# Patient Record
Sex: FEMALE | Race: WHITE | NOT HISPANIC OR LATINO | Employment: FULL TIME | URBAN - METROPOLITAN AREA
[De-identification: names, ages, dates, MRNs, and addresses within clinical notes are randomized per-mention and may not be internally consistent; named-entity substitution may affect disease eponyms.]

---

## 2021-09-17 ENCOUNTER — LAB REQUISITION (OUTPATIENT)
Dept: LAB | Facility: CLINIC | Age: 56
End: 2021-09-17

## 2021-09-17 PROCEDURE — 86706 HEP B SURFACE ANTIBODY: CPT | Performed by: INTERNAL MEDICINE

## 2021-09-17 PROCEDURE — 86762 RUBELLA ANTIBODY: CPT | Performed by: INTERNAL MEDICINE

## 2021-09-17 PROCEDURE — 86765 RUBEOLA ANTIBODY: CPT | Performed by: INTERNAL MEDICINE

## 2021-09-17 PROCEDURE — 86481 TB AG RESPONSE T-CELL SUSP: CPT | Performed by: INTERNAL MEDICINE

## 2021-09-17 PROCEDURE — 86787 VARICELLA-ZOSTER ANTIBODY: CPT | Performed by: INTERNAL MEDICINE

## 2021-09-17 PROCEDURE — 86735 MUMPS ANTIBODY: CPT | Performed by: INTERNAL MEDICINE

## 2021-09-18 LAB — HBV SURFACE AB SERPL IA-ACNC: 9.86 M[IU]/ML

## 2021-09-19 LAB
GAMMA INTERFERON BACKGROUND BLD IA-ACNC: 0.03 IU/ML
M TB IFN-G BLD-IMP: NEGATIVE
M TB IFN-G CD4+ BCKGRND COR BLD-ACNC: 9.97 IU/ML
MITOGEN IGNF BCKGRD COR BLD-ACNC: 0 IU/ML
MITOGEN IGNF BCKGRD COR BLD-ACNC: 0.02 IU/ML
QUANTIFERON MITOGEN: 10 IU/ML
QUANTIFERON NIL TUBE: 0.03 IU/ML
QUANTIFERON TB1 TUBE: 0.03 IU/ML
QUANTIFERON TB2 TUBE: 0.05

## 2021-09-20 LAB
MEV IGG SER IA-ACNC: 162 AU/ML
MEV IGG SER IA-ACNC: POSITIVE
MUMPS ANTIBODY IGG INSTRUMENT VALUE: 62.5 AU/ML
MUV IGG SER QL IA: POSITIVE
RUBV IGG SERPL QL IA: 1.2 INDEX
RUBV IGG SERPL QL IA: POSITIVE
VZV IGG SER QL IA: 606.7 INDEX
VZV IGG SER QL IA: POSITIVE

## 2022-01-03 ENCOUNTER — LAB REQUISITION (OUTPATIENT)
Dept: LAB | Facility: CLINIC | Age: 57
End: 2022-01-03

## 2022-01-03 PROCEDURE — 86706 HEP B SURFACE ANTIBODY: CPT | Performed by: INTERNAL MEDICINE

## 2022-01-04 LAB — HBV SURFACE AB SERPL IA-ACNC: >1000 M[IU]/ML

## 2023-06-04 ENCOUNTER — HEALTH MAINTENANCE LETTER (OUTPATIENT)
Age: 58
End: 2023-06-04

## 2023-06-30 ENCOUNTER — OFFICE VISIT (OUTPATIENT)
Dept: FAMILY MEDICINE | Facility: CLINIC | Age: 58
End: 2023-06-30
Payer: COMMERCIAL

## 2023-06-30 ENCOUNTER — TELEPHONE (OUTPATIENT)
Dept: VASCULAR SURGERY | Facility: CLINIC | Age: 58
End: 2023-06-30

## 2023-06-30 VITALS
WEIGHT: 203.6 LBS | OXYGEN SATURATION: 98 % | TEMPERATURE: 97.4 F | BODY MASS INDEX: 30.86 KG/M2 | SYSTOLIC BLOOD PRESSURE: 126 MMHG | DIASTOLIC BLOOD PRESSURE: 80 MMHG | HEART RATE: 85 BPM | HEIGHT: 68 IN

## 2023-06-30 DIAGNOSIS — Z13.220 LIPID SCREENING: ICD-10-CM

## 2023-06-30 DIAGNOSIS — I83.813 VARICOSE VEINS OF BOTH LOWER EXTREMITIES WITH PAIN: ICD-10-CM

## 2023-06-30 DIAGNOSIS — Z12.31 VISIT FOR SCREENING MAMMOGRAM: ICD-10-CM

## 2023-06-30 DIAGNOSIS — Z00.00 ROUTINE GENERAL MEDICAL EXAMINATION AT A HEALTH CARE FACILITY: Primary | ICD-10-CM

## 2023-06-30 DIAGNOSIS — Z12.11 SCREEN FOR COLON CANCER: ICD-10-CM

## 2023-06-30 DIAGNOSIS — Z11.3 ROUTINE SCREENING FOR STI (SEXUALLY TRANSMITTED INFECTION): ICD-10-CM

## 2023-06-30 PROBLEM — I83.93 VARICOSE VEINS OF LEGS: Status: ACTIVE | Noted: 2023-06-30

## 2023-06-30 PROBLEM — F90.9 ADHD (ATTENTION DEFICIT HYPERACTIVITY DISORDER): Status: ACTIVE | Noted: 2023-06-30

## 2023-06-30 PROBLEM — L71.9 ROSACEA: Status: ACTIVE | Noted: 2023-06-30

## 2023-06-30 PROBLEM — F32.A DEPRESSION: Status: ACTIVE | Noted: 2023-06-30

## 2023-06-30 PROBLEM — E66.9 OBESITY: Status: ACTIVE | Noted: 2023-06-30

## 2023-06-30 PROBLEM — E55.9 VITAMIN D DEFICIENCY: Status: ACTIVE | Noted: 2023-06-30

## 2023-06-30 PROCEDURE — 99386 PREV VISIT NEW AGE 40-64: CPT | Performed by: PHYSICIAN ASSISTANT

## 2023-06-30 PROCEDURE — 99213 OFFICE O/P EST LOW 20 MIN: CPT | Mod: 25 | Performed by: PHYSICIAN ASSISTANT

## 2023-06-30 PROCEDURE — 87491 CHLMYD TRACH DNA AMP PROBE: CPT | Performed by: PHYSICIAN ASSISTANT

## 2023-06-30 PROCEDURE — 87591 N.GONORRHOEAE DNA AMP PROB: CPT | Performed by: PHYSICIAN ASSISTANT

## 2023-06-30 ASSESSMENT — ENCOUNTER SYMPTOMS
NERVOUS/ANXIOUS: 0
DIARRHEA: 0
DIZZINESS: 0
EYE PAIN: 0
SORE THROAT: 0
HEMATURIA: 0
BREAST MASS: 0
NAUSEA: 0
HEMATOCHEZIA: 0
JOINT SWELLING: 0
ABDOMINAL PAIN: 0
HEADACHES: 0
CHILLS: 0
MYALGIAS: 1
ARTHRALGIAS: 1
COUGH: 0
PALPITATIONS: 0
FREQUENCY: 0
DYSURIA: 0
FEVER: 0
PARESTHESIAS: 0
SHORTNESS OF BREATH: 0
WEAKNESS: 0
HEARTBURN: 0
CONSTIPATION: 0

## 2023-06-30 ASSESSMENT — PAIN SCALES - GENERAL: PAINLEVEL: NO PAIN (0)

## 2023-06-30 NOTE — PROGRESS NOTES
SUBJECTIVE:   CC: Cori is an 57 year old who presents for preventive health visit.       2023     2:03 PM   Additional Questions   Roomed by sarita     Healthy Habits:     Getting at least 3 servings of Calcium per day:  Yes    Bi-annual eye exam:  NO    Dental care twice a year:  Yes    Sleep apnea or symptoms of sleep apnea:  None    Diet:  Regular (no restrictions)    Frequency of exercise:  2-3 days/week    Duration of exercise:  30-45 minutes    Taking medications regularly:  Yes    Medication side effects:  Not applicable    Additional concerns today:  Yes    Mammogram 2020 [facility], normal   Colonoscopy , repeat 5 years - lived in [place] from 1488-[address]  Pap - last in the last 5 years perhaps in [place], no history abnormals     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 (For example, a Health Directive, POLST, or a discussion with a medical provider or your loved ones about your wishes): No, advance care planning information given to patient to review.  Patient declined advance care planning discussion at this time.    Social History     Tobacco Use     Smoking status: Never     Smokeless tobacco: Never   Substance Use Topics     Alcohol use: Not on file         2023     1:59 PM   Alcohol Use   Prescreen: >3 drinks/day or >7 drinks/week? No     Reviewed orders with patient.  Reviewed health maintenance and updated orders accordingly - Yes      Breast Cancer Screenin/27/2023     7:35 PM   Breast CA Risk Assessment (FHS-7)   Do you have a family history of breast, colon, or ovarian cancer? No / Unknown       Mammogram Screening: Recommended mammography every 1-2 years with patient discussion and risk factor consideration  Pertinent mammograms are reviewed under the imaging tab.    History of abnormal Pap smear: NO - age 30-65 PAP every 5 years with negative HPV co-testing recommended     Reviewed and updated as needed this visit by  clinical staff   Tobacco  Allergies  Meds  Problems  Med Hx  Surg Hx  Fam Hx          Reviewed and updated as needed this visit by Provider   Tobacco  Allergies  Meds  Problems  Med Hx  Surg Hx  Fam Hx         History reviewed. No pertinent past medical history.   History reviewed. No pertinent surgical history.    Review of Systems   Constitutional: Negative for chills and fever.   HENT: Positive for congestion. Negative for ear pain, hearing loss and sore throat.    Eyes: Negative for pain and visual disturbance.   Respiratory: Negative for cough and shortness of breath.    Cardiovascular: Negative for chest pain, palpitations and peripheral edema.   Gastrointestinal: Negative for abdominal pain, constipation, diarrhea, heartburn, hematochezia and nausea.   Breasts:  Negative for tenderness, breast mass and discharge.   Genitourinary: Negative for dysuria, frequency, genital sores, hematuria, pelvic pain, urgency, vaginal bleeding and vaginal discharge.   Musculoskeletal: Positive for arthralgias and myalgias. Negative for joint swelling.   Skin: Negative for rash.   Neurological: Negative for dizziness, weakness, headaches and paresthesias.   Psychiatric/Behavioral: Negative for mood changes. The patient is not nervous/anxious.       OBJECTIVE:     Physical Exam  GENERAL: healthy, alert and no distress  EYES: Eyes grossly normal to inspection, PERRL and conjunctivae and sclerae normal  HENT: ear canals and TM's normal, nose and mouth without ulcers or lesions  NECK: no adenopathy, no asymmetry, masses, or scars and thyroid normal to palpation  RESP: lungs clear to auscultation - no rales, rhonchi or wheezes  CV: regular rate and rhythm, normal S1 S2, no S3 or S4, no murmur, click or rub, no peripheral edema and peripheral pulses strong  ABDOMEN: soft, nontender, no hepatosplenomegaly, no masses and bowel sounds normal  MS: no gross musculoskeletal defects noted, no edema  SKIN: no suspicious lesions  or rashes  NEURO: Normal strength and tone, mentation intact and speech normal  PSYCH: mentation appears normal, affect normal/bright    ASSESSMENT/PLAN:   Routine general medical examination at a health care facility  Wants to return for lab only when fasting.   Thinks she's had a pap in the last 5 years, no history of abnormals. Will look for records.  - Lipid panel reflex to direct LDL Fasting  - Comprehensive metabolic panel (BMP + Alb, Alk Phos, ALT, AST, Total. Bili, TP)  - CBC with platelets    Visit for screening mammogram  - MA SCREENING DIGITAL BILAT - Future  (s+30)    Screen for colon cancer  - Colonoscopy Screening  Referral    Routine screening for STI (sexually transmitted infection)  - NEISSERIA GONORRHOEA PCR  - CHLAMYDIA TRACHOMATIS PCR    Varicose veins of both lower extremities with pain  History of varicose vein procedures in the past and would like follow up with vascular for right thigh varicosity that is causing pain intermittently   - Vascular Surgery Referral    Lipid screening  - Lipid panel reflex to direct LDL Fasting       Patient has been advised of split billing requirements and indicates understanding: Yes      COUNSELING:  Reviewed preventive health counseling, as reflected in patient instructions      She reports that she has never smoked. She has never used smokeless tobacco.      Marivel Lopez PA-C  [facility]

## 2023-07-01 LAB
C TRACH DNA SPEC QL NAA+PROBE: NEGATIVE
N GONORRHOEA DNA SPEC QL NAA+PROBE: NEGATIVE

## 2023-08-03 ENCOUNTER — ANCILLARY PROCEDURE (OUTPATIENT)
Dept: GENERAL RADIOLOGY | Facility: CLINIC | Age: 58
End: 2023-08-03
Attending: PHYSICIAN ASSISTANT
Payer: COMMERCIAL

## 2023-08-03 ENCOUNTER — OFFICE VISIT (OUTPATIENT)
Dept: URGENT CARE | Facility: URGENT CARE | Age: 58
End: 2023-08-03
Payer: COMMERCIAL

## 2023-08-03 VITALS
SYSTOLIC BLOOD PRESSURE: 118 MMHG | OXYGEN SATURATION: 98 % | WEIGHT: 205 LBS | HEART RATE: 72 BPM | TEMPERATURE: 97.7 F | BODY MASS INDEX: 31.4 KG/M2 | DIASTOLIC BLOOD PRESSURE: 73 MMHG

## 2023-08-03 DIAGNOSIS — M54.6 ACUTE BILATERAL THORACIC BACK PAIN: Primary | ICD-10-CM

## 2023-08-03 PROCEDURE — 72070 X-RAY EXAM THORAC SPINE 2VWS: CPT | Mod: TC | Performed by: RADIOLOGY

## 2023-08-03 PROCEDURE — 99213 OFFICE O/P EST LOW 20 MIN: CPT | Performed by: PHYSICIAN ASSISTANT

## 2023-08-03 RX ORDER — TIZANIDINE HYDROCHLORIDE 4 MG/1
4 CAPSULE, GELATIN COATED ORAL 3 TIMES DAILY
Qty: 30 CAPSULE | Refills: 0 | Status: SHIPPED | OUTPATIENT
Start: 2023-08-03

## 2023-08-03 NOTE — PROGRESS NOTES
SUBJECTIVE  HPI: Cori Toure is a 57 year old female who presents for evaluation of back pain  Symptoms began 1 day ago, have been sudden and are stable.  Pain is located in the upper back region bilaterally (R>L), with radiation to does radiate up into the neck at times, and are at worst a 8 on a scale of 1-10.  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 Yesterday when she was moving a patient who under anesthesia, she was flipping the patient over and moving them up into position. She did not have pain right away and was able to work the rest of the day. However when she went home and last night she had difficulty falling asleep due to her back pain. She returned to work this morning but was sent home due to her being unable to wear the lead vest required in the interventional radiology room as it exacerbated her pain.   Personal hx of back pain is recurrent self limited episodes of low back pain in the past and Scoliosis.  Pain is exacerbated by: coughing, bending and reaching, and changing position.  Pain is partially relieved by: heat and OTC NSAIDs  Denies: fecal incontinence, tingling, urinary incontinence, fever, chills, dizziness, weakness, numbness, tingling.  Treatments tried: Taking 800 mg ibuprofen q6hrs, Voltaren gel, and lidocaine patch, heat and ice.    No past medical history on file.  No current outpatient medications on file.     Social History     Tobacco Use    Smoking status: Never    Smokeless tobacco: Never   Substance Use Topics    Alcohol use: Not on file     ROS:  CONSTITUTIONAL:NEGATIVE for fever, chills, change in weight  INTEGUMENTARY/SKIN: NEGATIVE for worrisome rashes, moles or lesions  ENT/MOUTH: NEGATIVE for ear, mouth and throat problems  RESP:NEGATIVE for significant cough or SOB  CV: NEGATIVE for chest pain, palpitations or peripheral edema  GI: NEGATIVE for nausea, abdominal pain,  heartburn, or change in bowel habits  MUSCULOSKELETAL: POSITIVE for back pain. NEGATIVE for significant arthralgias or myalgia  NEURO: NEGATIVE for weakness, dizziness or paresthesias  Review of systems negative except as stated above.    OBJECTIVE:  /73   Pulse 72   Temp 97.7  F (36.5  C) (Tympanic)   Wt 93 kg (205 lb)   SpO2 98%   BMI 31.40 kg/m    Back examination: No skin lesions, erythema or scars. No spinal or perispinal tenderness to palpation. ROM normal. No CVA tenderness. positive findings: thoracolumbar scoliosis of Mild degree, mild tenderness to palpation between scapulas.   [unfilled] leg raise test: not done  GENERAL APPEARANCE: healthy, alert and no distress  RESP: lungs clear to auscultation - no rales, rhonchi or wheezes  CV: regular rates and rhythm, normal S1 S2, no murmur noted  NEURO: Normal strength and tone with no weakness or sensory deficit noted, reflexes normal   SKIN: no suspicious lesions or rashes    ASSESSMENT/IMPRESSION:  Acute bilateral thoracic back pain   Thoracic xray - DJD and scoliosis    PLAN:  - Zanaflex prescribed  - Continue stretching and strengthening exercises.   - Continue prn heat or ice application.  - Continue ibuprofen 800 mg q6hrs  - PT referral placed    Education Documentation  No documentation found.  Education Comments  No comments found.

## 2023-08-14 ENCOUNTER — OFFICE VISIT (OUTPATIENT)
Dept: VASCULAR SURGERY | Facility: CLINIC | Age: 58
End: 2023-08-14
Attending: PHYSICIAN ASSISTANT
Payer: COMMERCIAL

## 2023-08-14 DIAGNOSIS — I78.1 ASYMPTOMATIC SPIDER VEINS OF BOTH LOWER EXTREMITIES: ICD-10-CM

## 2023-08-14 DIAGNOSIS — I83.811 VARICOSE VEINS OF LEG WITH PAIN, RIGHT: Primary | ICD-10-CM

## 2023-08-14 PROCEDURE — 99203 OFFICE O/P NEW LOW 30 MIN: CPT | Performed by: SURGERY

## 2023-08-14 NOTE — LETTER
8/14/2023         RE: Cori Toure  [address]  [place] [address]        Dear Colleague,    Thank you for referring your patient, Cori Toure, to the [facility] [place]. Please see a copy of my visit note below.     [facility]: Dulce Toure comes for evaluation of her varicose veins.  Has had a history of bilateral lesser saphenous vein stripping and GSV right endovascular treatment.  Now complaining of right thigh painful varicosities.    Lesser saphenous vein open surgery performed along the phlebectomies when she was approximately 32 years old.  Laser ablation right greater saphenous vein approximately 7 years ago.  Several episodes of sclerotherapy also performed with no complications.    So area in the right mid lateral thigh over varicosities.  Area on the left mid anterior lateral calf where there is no varicosities with several more advanced spider veins.  She does have thigh-high compression stockings but these do not stay in place and tend to roll down.  No complications of the varicosities or spider veins.    PMH: Medications: Zanaflex   Medical: History of ADHD/depression well-controlled     Non-smoker.    FMH: Noncontributory    Exam: Alert and appropriate.  Normal affect.  Very pleasant.   Chest= clear   Cardiovascular= regular rate   +3 DP pulses bilaterally.    Somewhat heavyset thighs   2 to 3 mm varicosity on the medial distal right anterior lateral thigh where she has discomfort.  No evidence of more proximal varicosities.  No obvious large varicosities left leg particularly left lateral calf.  Diffuse spider veins in the thighs and calfs some of these relatively [place] also involving the ankle region.      IMPRESSION: #1.  Worsening symptomatic isolated right mid and distal thigh varicosities.  Unable to wear compression therapy on a regular basis.  Discussed cosmetic stab phlebectomies with its risks and benefits and she is very  interested in this.  This would be a 1/2 unit of phlebectomy.  With the location of varicose vein I do not feel that repeat venous testing is necessary.     #2.  Also discussed sclerotherapy and newer agents that were using such as polidocanol with its risks and benefits.  This may help with discomfort of left anterior lateral calf along with the cosmetic treatments.  Spider veins are very amenable to injection and she does understand the risks and benefits and chance of recurrence.  Due to her more diffuse spider veins that he is not all be able to be treated at 1 time.    If she does decide to proceed I would initially start with the sclerotherapy using the maximum number of syringes at 6 to treat as many areas as we can.  She would be lightly sedated with her Ativan and clonidine at the same time perform the cosmetic stab phlebectomies.    Bilateral VCSS=2 CEAP: C2      Gary Montilla MD       August 14, 2023    Vein Procedure Recommendation    Spoke with patient in clinic.    Dr. Montilla has recommended patient to have the following vein procedure(s):     1. Right leg  0.5 units Phlebectomies (cosmetic)    2. Bilateral leg Sclerotherapy (cosmetic)      Patient is recommended to wear Thigh High compression hose following her procedure. Discussed compression hose. Explained to patient if insurance doesn't cover the compression hose there are a couple different options to getting them and to call the clinic to let us know if they need help.    Handed patient written procedure instructions to review on her own (see After Visit Summary).    Next steps:    Insurance Submission  Informed patient this process could take up to 14 business days, but once approved, the patient will be contacted by our surgery scheduler to schedule the above procedure. Gave patient our surgery scheduler's information.    Patient is in agreement with all of the above and has no further questions at this time.    Anu Roach,  [facility]  Vein Clinic      Again, thank you for allowing me to participate in the care of your patient.        Sincerely,        Gary Montilla MD

## 2023-08-14 NOTE — NURSING NOTE
Patient Reported symptoms:    Bilateral leg   Heaviness Some of the time   Achiness A good bit of the time   Swelling A little of the time   Throbbing A little of the time   Itching None of the time   Appearance Slightly noticeable   Impact on work/activities Moderately reduced

## 2023-08-14 NOTE — PATIENT INSTRUCTIONS
Sclerotherapy: Pre-Treatment Instructions    Recommended Sessions:  1 treatment sessions    Pricing: Full session - $407                 *Payment is due at the time of visit following the treatment    Time Required per Treatment Session - About 45 minutes  Please come in 15 minutes before your scheduled appointment.  30 min.  Sclerotherapy treatments last approximately 30 minutes.  5 min.    A staff member will wipe your legs off with warm water and dry them with a wash cloth.                 Then you can put your compression hose on, get dressed and check out.  10 min.  After your treatment, you will be asked to walk around for 10 minutes before you get in your car.    Medications  Five days before your appointment, discontinue aspirin (Bufferin, Anacin, etc.) and Ibuprofen (Motrin, Advil, Aleve, etc.) to reduce bruising. Resume these medications the day following the treatment.    Leg Preparation  Do not shave your legs or apply any oil, lotion or powder the night before or the day of your treatment.    Clothing  Shorts:  Bring a pair of loose, comfortable shorts to wear during your treatment (or you can choose to wear ours). Shoes: Bring comfortable shoes to accommodate the compression hose after your treatment. Do not wear flip-flops or thong-style sandals unless you have open-toe compression hose.    Photographs  Photos will be taken before each treatment. This helps monitor your progress.    Injections  The physician will inject your veins with the sclerotherapy solution chosen to meet your specific needs.    Compression Hose  Please bring your compression hose if you have them. They may also be reserved for you at our clinic. Compression hose must be worn immediately after each sclerotherapy treatment.  The hose must be compression level 20-30, and they must be worn for 24 hours straight after your treatment.  If you have never worn compression hose before, a staff member will teach you how to put them  on.             You cannot have a treatment without compression hose.               They are critical to the success of your treatment!    You may purchase your compression hose from us. We will measure you and have the hose available when you come for your treatment.    Cancellation and Rescheduling  If you need to cancel or reschedule your sclerotherapy treatment, please give our office at least 24 hour notice.    Sclerotherapy: Basic Information    What is sclerotherapy?  Sclerotherapy is a treatment for  spider  veins.  Spider  veins are small veins just under your skin that can look red, blue or purple. Most  spider  veins are only a cosmetic problem.  Spider  veins are not useful and treating them will not affect your circulation.    How does sclerotherapy work?  1.  Injections: A very small needle is used to inject a solution into the  spider  veins. The solution irritates the cells that line the vein walls. This causes the veins to collapse. The vein walls to stick together and they can no longer carry blood. Different solutions are used based on the size of the veins.  2.  Compression:  The spider veins are kept collapsed by wearing compression stockings. Your body will break down and absorb the treated veins. You wear the compression hose for 24 hours after the treatment and then for 4 more days during your waking hours only.    How does the body heal after sclerotherapy?  The process is similar to how your body heals after a bad bruise. It takes 4-6 weeks or more for the healing to be complete. When the healing is complete, the vein is no longer visible. It may take more than one treatment.    How do I get the best results?  It is important to follow the post-sclerotherapy instructions. The best results require time and patience. The injection sites will continue to heal and fade for months after a treatment. Please discuss your expectations with your doctor to keep them realistic. Your doctor will do  everything possible to meet or exceed your expectations.    How many treatments are needed?  After your initial exam, your doctor will give you an estimate of the number of treatments that may be required. It depends upon the size, type, and quantity of your  spider  veins and on the doctor's assessment, your history and expectations. You may end up needing fewer or more treatments.    How soon can I have another treatment?  Additional treatments are scheduled every 4-6 weeks to allow time for the body to respond to the previous treatment.    Common Side Effects:  Itching  The areas that were injected may itch. This is usually mild and lasts less than a day. Do not use lotions or creams on your legs until the injection sites have healed over.    Pain  It is common to have some tenderness at the injection sites. Injection of the solution can be uncomfortable, but is usually well tolerated by most patients. The tenderness is temporary, lasting 24 hours at most. Tylenol or Ibuprofen can be used, if needed, following the product directions.    Bruising  This may occur at the injections sites. Bruising may be minimized by avoiding Aspirin and Ibuprofen products for five days before each treatment session.    Hyperpigmentation  A light brown discoloration of the skin may develop along the veins in the areas injected. Approximately 20-30% of patients treated note the discoloration (which is lighter and less obvious than the veins that are being treated). The hyperpigmentation usually fades in a couple of weeks, but may take several months to a year to totally resolve. There is a 1% chance of hyperpigmentation continuing after one year.    Trapped blood  A small amount of blood may become trapped and hardened in the veins. This may feel like a knot or cord and it may look dark blue or bruised. This is a common occurrence. You may need to return before your next treatment so this area can be drained to remove the trapped  blood. This will reduce the hyperpigmentation that can occur. The chance of this occurring can be decreased with proper use of compression hose after your treatment.    Matting  Matting is the formation of new, fine  spider  veins in the area injected.  It occurs in approximately 10% of patients injected. The exact reason for this is unknown. If untreated, the matting usually resolves in 3 to 12 months, but very rarely, it can be permanent. If the matting does not fade, it can be re-injected.    Rare Side Effects:  Ulceration at injection sites  Very rarely, a small ulcer will occur at the site where a vein is injected. An ulcer can take 4 to 6 weeks to completely heal. A small scar may result.    Allergic reactions  There is a very rare incidence of an allergic reaction to the solution injected. You will be observed for such reaction and will be treated appropriately should it occur. Please inform us of any allergy history.    Pulmonary embolus/Deep vein thrombosis  This is a blood clot which moves to the lungs/a blood clot in the deep vein system. There is an extraordinarily low incidence of this complication.      SCLEROTHERAPY AFTER CARE  Immediately:  After treatment, walk for 10-15 minutes before getting in your car.  If your trip home is more than 1 hour, stop and walk around for 5-10 minutes. Avoid sitting or standing for extended periods.   First 24 hours: Wear your compression continuously, even while in bed. After the 24 hours, you may shower if you want to. Put your hose back on, unless you are going to bed. You should NOT wear compression to bed after the first 24 hours. You may fly the next day, but wear your compression.   For 5 days: Wear the compression hose for waking hours only. You may continue to wear them longer than 5 days if you prefer.   For days 5-7: Walking is encouraged, as it promotes efficient circulation in your veins. You may do activities that raise your heart rate, but do NOT run,  jog, do high impact aerobics, or weight lifting. After 7 days, no activity restrictions.  Shaving: Wait a few days to shave or apply lotion.   Bathing: Do NOT take hot baths or sit in a hot tub for 7-10 days.    For 1 year: Wear SPF 30 sunscreen on your legs when in the sun. This is very important! It helps prevent darkening of the skin at the injection sites.   Medications: You may resume your usual medications, including aspirin or ibuprofen.    Common Things to Expect       Compression must be worn for the first 24 hours and then during the day for 5-7 days.    If larger veins are treated with ultrasound-guided sclerotherapy, you will have redness, firmness, tenderness, and swelling.  This firmness and tenderness may take 3-6 months to resolve. Ibuprofen and compression hose will aid in this process.    You will have bruising that can last up to 3 weeks. Most fading of the veins will occur between 3 and 6 weeks after treatment.    You may notice brown discoloration (hyperpigmentation) at the treatment site.  This should fade with time, but will take 3 months to 1 year to fully heal.     Some treated veins may look darker because of trapped blood within the vein. This trapped blood can be removed at a minimum of 1 month following treatment. Larger veins are more likely to develop trapped blood.    It is very important for you to use at least SPF 30 sunscreen in order to help prevent the discoloration of your skin.    Migraines rarely occur following sclerotherapy, but are more likely in patients with a history of migraines.  Treat as you would any other migraine.        Pre-Procedure Instructions:                       Phlebectomies  You are having Phlebectomies, where one or more of your veins are removed.    Pricing  0.5 unit phlebectomies: $534   *Payment is due at the time of visit following the treatment    Your Current Medications and Allergies  To reduce bruising, please do not take aspirin-type medications  (Motrin, Aleve, Ibuprofen, Advil, etc.) for three days before your procedure. You may take Tylenol if you need a pain reliever.  Are you on blood thinner medications? (Plavix, Coumadin, Eliquis, Xarelto) Please discuss this with your surgeon. You may resume taking your blood thinner medication after your procedure.  Are you sensitive to latex or adhesives used for fake fingernails? Please let us know!    Driving Escort and   Please arrange to have a trusted adult (18 years old or older) drive you to and from the clinic.  For your safety, we recommend you have a trusted adult stay with you until the next morning.    Your Health  If you have a change in your health before the procedure, contact our office immediately.  (For example: cold symptoms, cough, urinary tract infection, fever, flu symptoms.)  A pre-procedure physical is not required.     Note  It is sometimes necessary to adjust the procedure schedule due to emergencies. We greatly appreciate your flexibility and understanding in this matter.        _____________________________  ________________________    Check List: The Morning of Your Procedure  ___1. Please do not put anything on your leg(s) or shave the day of your procedure.  ___2. You may take your normal medications the day of your procedure.  ___3. It is recommended you eat a light breakfast or lunch the day of your procedure.  ___4. Wear comfortable loose-fitting clothing and wide-fitting shoes (i.e. tennis shoes, slip-ons).  ___5. Please arrive at our clinic at the specified time given by the nurse.  ___6. You will sign an affirmation of informed consent.  ___7. Bring your pre-procedure sedation medication (lorazepam and clonidine) with you to the clinic. One hour             before your procedure, you will be instructed to take these medications. The lorazepam (Ativan) lowers             anxiety and sedates you; the clonidine makes the lorazepam more effective. Everyone's body processes              these medications differently. Therefore, reactions to these medications vary. Some people stay awake             and some people sleep through the whole procedure. You may not remember everything about the             procedure or the day. You do not want to make any big decisions for the rest of the day.              The Day of Your Procedure:                                  Phlebectomies  In the Exam Room  A nurse will bring you back to an exam room with your family member or friend. This is when your informed consent will be signed, and you will take your pre-procedure medications.  You will be asked to remove everything from the waist down, including undergarments. You will then put on a hospital gown or shorts and blue booties.  Your surgeon will come in to answer any questions and desiree any bulging varicose veins to be removed.  You will be taken to the restroom to empty your bladder before going into the procedure room.    In the Procedure Room  You will be escorted to the procedure room. You will lie on a procedure table covered with a sheet or blanket.  A nurse will put a blood pressure cuff on your arm and a pulse/oxygen monitor on a finger. Your vital signs will be monitored every 15 minutes.  Your gown will be pulled up slightly and the groin exposed for a short period of time. The surgeon's assistant will clean your foot, leg, and groin with an antibacterial solution. We will get you covered up as quickly as possible!  Sterile towels and drapes will be used to cover you and the table. You will be asked to keep your hands under the drapes during the procedure.  The lights will be turned down. The table will be tipped so your feet are higher than your head. You may feel like you're going to slide off, but you won't.  The Procedure  Medication will be injected to numb your leg. You will feel some needle sticks and may feel discomfort as the medication goes in.   Once this is done, you should  not experience significant discomfort. But if you do, please let us know and more numbing medication can be injected.  Small incisions are made where the bulging varicose veins have been marked on your leg(s) and these veins will be removed using a small nitesh hook instrument.    Post-Procedure  Once the procedure is done, your leg(s) will be washed with warm water and dried. Your leg(s) will be bandaged with large soft dressings and a large ACE bandage wrapped from toes to groin.   You will be offered something to drink and a light snack.  You will rest with your leg(s) elevated for approximately 30 minutes. Your friend or family member may join you.  For your safety, you will be taken to your car in a wheelchair. If you are able to, it is good to keep your leg(s) elevated on the car ride home.        Post-Procedure Instructions:                               Phlebectomies      Post-Op Day Zero - The Day of Your Procedure:  1. Medication for Pain Control and Inflammation Control   - The numbing medication injected during your procedure will last for several hours. The pre-procedure                 tablets may make you very sleepy and you might not remember everything from the procedure or from                 the day. This will usually wear off by the next day.   - Ibuprofen: If tolerated, take ibuprofen (e.g., Advil) to reduce inflammation whether or not you have                 pain. For three days, take two tablets (200mg each) with every meal and at bedtime with a snack. If                 your pain is not controlled with ibuprofen, you may take prescription pain medication (such as Norco),                 if prescribed.   - You may resume taking any medications you were taking before your procedure.  2. Activity   - Rest with your leg(s) elevated above your heart. This will prevent from a lot of swelling and                 bleeding. You do not need to elevate your leg(s) while sleeping at night. You may go  upstairs, sit up to                 eat, use the bathroom, and take several five-minute walks. Otherwise, keep your leg(s) elevated.                 Minimize the amount of time you are up on your feet to about 30 minutes at a time.  3. Bandages   - The incision sites will be covered with soft bandages and an ACE wrap. Keep your bandages on               and dry for 48 hours. The ACE should provide  snug  compression but should not cause pain or               numbness in the toes. If you have significant discomfort or your toes become cold or numb, unwrap               your ACE and rewrap with less tension starting at the toes wrapping upward.  4. Incisions   - Bleeding: You may see some incision sites that are oozing through the bandages. This is not unusual    and can be managed with Rest, Ice, Compression and Elevation (RICE). Apply ice and firm pressure    directly to the site that is bleeding and rest with your leg(s) elevated above your heart for 20-30               minutes.    Post-Op Day One:  1. Medication   - Ibuprofen: Continue the same as the Day of Your Procedure. If your pain is not controlled with    ibuprofen, you may take prescription pain medication (such as Norco), if prescribed.  2. Activity   - We would like you to get up at least six times and walk around for short periods of time, unless it is    causing you pain. You should not be on your feet more than 90 minutes at a time. Elevate your leg    above your heart when you are not walking.  3. Bandages   - Your bandages must be kept on and dry for 48 hours.  4. Driving   - You may resume driving when you can do so safely. Do not drive if you are taking narcotic pain    medication.  Post-Op Day Two:   1. Medication  - Ibuprofen: Continue the same as the Day of Your Procedure.  2.  Activity   - Walk as tolerated. Elevate as much as possible when not walking.  3. Bandages  - You may remove ACE wrap and padding, unless otherwise instructed by your  physician. If your return appointment is before 48 hours, we will take the bandages off for you at the clinic. You may shower like normal after your bandages are removed. To aid in the healing, your physician may recommend wearing compression hose for at least one to two weeks following your phlebectomies.  4. Incisions   - Your leg(s) will be bruised; there may be swelling, hard knots under the skin and possibly some    numbness. These will likely resolve over time. If you see  hair-like  strings coming out of your    incisions, do not pull them (this will only cause pain/discomfort). We will trim them when you come   back for your follow-up appointment.  5. Call Us If:   - You see any areas on your leg that are red and angry in appearance.   - You notice any drainage that is milky or cloudy in appearance or that has a foul odor.   - You run a temperature of 100.5 or greater.    Post-Op Day Three:  You will have a follow up appointment 2-4 days post-procedure. At this appointment, we will check your incisions and see how you are doing.   ________________________________________________________________________________________    The Two Weeks Following Your Procedure  1.  Skin Care   - Do not use any lotions, creams or powders on your incisions for 14 days or until the incisions have               healed.   - Do not soak in a bathtub, hot tub or go swimming for 14 days or until your incisions have healed.  2.  Medications   - You may use ibuprofen or acetaminophen (e.g., Tylenol) as needed for pain or discomfort.  3.  Activity   - Do not lift over 25 pounds. After about ten days you may resume exercise such as aerobics, running,    tennis or weightlifting. Use your common sense and ease back into your exercise routine slowly.  4.  Travel   - Do not fly in an airplane for 14 days after your procedure.  If you have a long car trip planned within    two to three weeks following your procedure, stop and walk for a few  minutes every two hours.    Periodic ankle pumps during the ride may be helpful.    Six Week Appointment  - At your six-week appointment, you will see your surgeon for an exam and evaluation. This office visit               will be scheduled when you return for post-op day three return appointment.    Return to Work  1.  If you work outside the home, you may return to work in a few days depending on the extent of your        procedure, how you tolerate it, and the type of work you perform.  2.  Paperwork: If your employer requires paperwork or you would like a letter written to your employer, please        let us know. We will complete disability type forms at no charge. Please allow five business days for forms        to be completed.

## 2023-08-14 NOTE — PROGRESS NOTES
August 14, 2023    Vein Procedure Recommendation    Spoke with patient in clinic.    Dr. Montilla has recommended patient to have the following vein procedure(s):     1. Right leg  0.5 units Phlebectomies (cosmetic)    2. Bilateral leg Sclerotherapy (cosmetic)      Patient is recommended to wear Thigh High compression hose following her procedure. Discussed compression hose. Explained to patient if insurance doesn't cover the compression hose there are a couple different options to getting them and to call the clinic to let us know if they need help.    Handed patient written procedure instructions to review on her own (see After Visit Summary).    Next steps:    Insurance Submission  Informed patient this process could take up to 14 business days, but once approved, the patient will be contacted by our surgery scheduler to schedule the above procedure. Gave patient our surgery scheduler's information.    Patient is in agreement with all of the above and has no further questions at this time.    Anu Roach RN  [facility]  Vein Clinic

## 2023-08-14 NOTE — PROGRESS NOTES
[facility]: Dulce Toure comes for evaluation of her varicose veins.  Has had a history of bilateral lesser saphenous vein stripping and GSV right endovascular treatment.  Now complaining of right thigh painful varicosities.    Lesser saphenous vein open surgery performed along the phlebectomies when she was approximately 32 years old.  Laser ablation right greater saphenous vein approximately 7 years ago.  Several episodes of sclerotherapy also performed with no complications.    So area in the right mid lateral thigh over varicosities.  Area on the left mid anterior lateral calf where there is no varicosities with several more advanced spider veins.  She does have thigh-high compression stockings but these do not stay in place and tend to roll down.  No complications of the varicosities or spider veins.    PMH: Medications: Zanaflex   Medical: History of ADHD/depression well-controlled     Non-smoker.    FMH: Noncontributory    Exam: Alert and appropriate.  Normal affect.  Very pleasant.   Chest= clear   Cardiovascular= regular rate   +3 DP pulses bilaterally.    Somewhat heavyset thighs   2 to 3 mm varicosity on the medial distal right anterior lateral thigh where she has discomfort.  No evidence of more proximal varicosities.  No obvious large varicosities left leg particularly left lateral calf.  Diffuse spider veins in the thighs and calfs some of these relatively Ganson also involving the ankle region.      IMPRESSION: #1.  Worsening symptomatic isolated right mid and distal thigh varicosities.  Unable to wear compression therapy on a regular basis.  Discussed cosmetic stab phlebectomies with its risks and benefits and she is very interested in this.  This would be a 1/2 unit of phlebectomy.  With the location of varicose vein I do not feel that repeat venous testing is necessary.     #2.  Also discussed sclerotherapy and newer agents that were using such as polidocanol with its risks  and benefits.  This may help with discomfort of left anterior lateral calf along with the cosmetic treatments.  Spider veins are very amenable to injection and she does understand the risks and benefits and chance of recurrence.  Due to her more diffuse spider veins that he is not all be able to be treated at 1 time.    If she does decide to proceed I would initially start with the sclerotherapy using the maximum number of syringes at 6 to treat as many areas as we can.  She would be lightly sedated with her Ativan and clonidine at the same time perform the cosmetic stab phlebectomies.    Bilateral VCSS=2 CEAP: C2      Gary Montilla MD

## 2023-10-04 ENCOUNTER — TELEPHONE (OUTPATIENT)
Dept: FAMILY MEDICINE | Facility: CLINIC | Age: 58
End: 2023-10-04
Payer: COMMERCIAL

## 2023-10-04 NOTE — TELEPHONE ENCOUNTER
Patient Quality Outreach    Patient is due for the following:   Cervical Cancer Screening - PAP Needed    Next Steps:   Schedule a office visit for PAP Exam    Type of outreach:    Sent [employer] message.      Questions for provider review:    None           Nancy Rainey MA

## 2023-10-04 NOTE — LETTER
October 18, 2023      Cori oTure  [address] [place] [address]        Dear Cori,    I care about your health and have reviewed your health plan. I have reviewed your medical conditions, medication list, and lab results and am making recommendations based on this review, to better manage your health.    You are in particular need of attention regarding:  -Cholesterol  -Colon Cancer Screening  -Cervical Cancer Screening  -Vaccinations    I am recommending that you:  -schedule a FOLLOWUP OFFICE APPOINTMENT with me.  I will recheck your: Lipids (fasting cholesterol - nothing to eat except water and/or meds for 8+ hours) test.    Here is a list of Health Maintenance topics that are due now or due soon:  Health Maintenance Due   Topic Date Due    Mammogram  Never done    Hepatitis B Vaccine (1 of 3 - 3-dose series) Never done    Colorectal Cancer Screening  Never done    PAP Smear  Never done    Cholesterol Lab  Never done    Zoster (Shingles) Vaccine (1 of 2) Never done    Flu Vaccine (1) 09/01/2023    COVID-19 Vaccine (4 - 2023-24 season) 09/01/2023       Please call us at [phone] (or use [employer]) to address the above recommendations.     Thank you for trusting [facility] and we appreciate the opportunity to serve you.  We look forward to supporting your healthcare needs in the future.    Healthy Regards,    Marivel Lopez PA-C

## 2024-10-05 ENCOUNTER — HEALTH MAINTENANCE LETTER (OUTPATIENT)
Age: 59
End: 2024-10-05

## 2025-03-26 ENCOUNTER — OFFICE VISIT (OUTPATIENT)
Dept: FAMILY MEDICINE | Facility: CLINIC | Age: 60
End: 2025-03-26
Payer: COMMERCIAL

## 2025-03-26 VITALS
SYSTOLIC BLOOD PRESSURE: 117 MMHG | WEIGHT: 195 LBS | RESPIRATION RATE: 16 BRPM | TEMPERATURE: 97.5 F | DIASTOLIC BLOOD PRESSURE: 75 MMHG | OXYGEN SATURATION: 97 % | HEART RATE: 70 BPM | HEIGHT: 68 IN | BODY MASS INDEX: 29.55 KG/M2

## 2025-03-26 DIAGNOSIS — Z87.898 H/O MOTION SICKNESS: ICD-10-CM

## 2025-03-26 DIAGNOSIS — Z71.84 TRAVEL ADVICE ENCOUNTER: Primary | ICD-10-CM

## 2025-03-26 DIAGNOSIS — F40.243 ANXIETY WITH FLYING: ICD-10-CM

## 2025-03-26 PROCEDURE — 91320 SARSCV2 VAC 30MCG TRS-SUC IM: CPT | Performed by: NURSE PRACTITIONER

## 2025-03-26 PROCEDURE — 1125F AMNT PAIN NOTED PAIN PRSNT: CPT | Performed by: NURSE PRACTITIONER

## 2025-03-26 PROCEDURE — 3074F SYST BP LT 130 MM HG: CPT | Performed by: NURSE PRACTITIONER

## 2025-03-26 PROCEDURE — 90632 HEPA VACCINE ADULT IM: CPT | Mod: GA | Performed by: NURSE PRACTITIONER

## 2025-03-26 PROCEDURE — 99212 OFFICE O/P EST SF 10 MIN: CPT | Mod: 25 | Performed by: NURSE PRACTITIONER

## 2025-03-26 PROCEDURE — 90480 ADMN SARSCOV2 VAC 1/ONLY CMP: CPT | Performed by: NURSE PRACTITIONER

## 2025-03-26 PROCEDURE — 3078F DIAST BP <80 MM HG: CPT | Performed by: NURSE PRACTITIONER

## 2025-03-26 PROCEDURE — 90471 IMMUNIZATION ADMIN: CPT | Mod: GA | Performed by: NURSE PRACTITIONER

## 2025-03-26 PROCEDURE — 99401 PREV MED CNSL INDIV APPRX 15: CPT | Mod: 25 | Performed by: NURSE PRACTITIONER

## 2025-03-26 RX ORDER — CHLORAL HYDRATE 500 MG
2 CAPSULE ORAL DAILY
COMMUNITY

## 2025-03-26 RX ORDER — CHOLECALCIFEROL (VITAMIN D3) 50 MCG
125 TABLET ORAL DAILY
COMMUNITY

## 2025-03-26 RX ORDER — ALPRAZOLAM 0.5 MG
0.5 TABLET ORAL PRN
Qty: 4 TABLET | Refills: 0 | Status: SHIPPED | OUTPATIENT
Start: 2025-03-26

## 2025-03-26 RX ORDER — ONDANSETRON 4 MG/1
4 TABLET, ORALLY DISINTEGRATING ORAL EVERY 8 HOURS PRN
Qty: 10 TABLET | Refills: 0 | Status: SHIPPED | OUTPATIENT
Start: 2025-03-26

## 2025-03-26 ASSESSMENT — PAIN SCALES - GENERAL: PAINLEVEL_OUTOF10: MILD PAIN (3)

## 2025-03-26 NOTE — PROGRESS NOTES
"Nurse Note ( Pre-Travel Consult)    Itinerary:  [place]    Departure Date: 04/25    Return Date: 05/08    Length of Trip 2 weeks    Reason for Travel: Tourism         Urban or rural: both    Accommodations: Hotel  Boat        IMMUNIZATION HISTORY  Have you received any immunizations within the past 4 weeks?  No  Have you ever fainted from having your blood drawn or from an injection?  No  Have you ever had a fever reaction to vaccination?  No  Have you ever had any bad reaction or side effect from any vaccination?  No  Do you live (or work closely) with anyone who has AIDS, an AIDS-like condition, any other immune disorder or who is on chemotherapy for cancer?  No  Do you have a family history of immunodeficiency?  No  Have you received any injection of immune globulin or any blood products during the past 12 months?  No    Patient roomed by Bradnon Villelamat PALOMINO Tejla is a 59 year old female seen today alone for counsultation for international travel.   Patient will be departing in  1 month(s) and  traveling with friends and with organized tour group.      Patient itinerary :  will be biking in the [place] region of [facility]  which risk for   Boat Bike tours     Patient's activities will include sightseeing and Biking and Boating     Patient's country of birth is [place]    Special medical concerns: anxiety with flying  Pre-travel questionnaire was completed by patient and reviewed by provider.     Vitals: /75   Pulse 70   Temp 97.5  F (36.4  C) (Temporal)   Resp 16   Ht 1.721 m (5' 7.75\")   Wt 88.5 kg (195 lb)   LMP 03/26/2024 (Within Months)   SpO2 97%   BMI 29.87 kg/m    BMI= Body mass index is 29.87 kg/m .    EXAM:  General:  Well-nourished, well-developed in no acute distress.  Appears to be stated age, interacts appropriately and expresses understanding of information given to patient.    Current Outpatient Medications   Medication Sig Dispense Refill    ALPRAZolam (XANAX) 0.5 MG " tablet Take 1 tablet (0.5 mg) by mouth as needed for anxiety (for flight anxiety). 4 tablet 0    ESTRIOL 0.5MG/GM CREAM Insert 1 gram vaginally 2-3 times per week.      fish oil-omega-3 fatty acids 1000 MG capsule Take 2 g by mouth daily.      magnesium citrate solution (NOT currently available) Take 296 mLs by mouth once.      ondansetron (ZOFRAN ODT) 4 MG ODT tab Take 1 tablet (4 mg) by mouth every 8 hours as needed for nausea or vomiting. 10 tablet 0    Turmeric (QC TUMERIC COMPLEX PO) Take 1,000 mg by mouth daily.      vitamin D3 (CHOLECALCIFEROL) 50 mcg (2000 units) tablet Take 125 mcg by mouth daily.       Patient Active Problem List   Diagnosis    PFO (patent foramen ovale)    Rosacea    Varicose veins of legs    Vitamin D deficiency    Obesity    Depression    ADHD (attention deficit hyperactivity disorder)     No Known Allergies      Immunizations discussed include:   Covid 19: Ordered/given today, risks, benefits and side effects reviewed  Hepatitis A:  Ordered/given today, risks, benefits and side effects reviewed  Hepatitis B: immune  Influenza: Up to date  Typhoid: Not indicated  Rabies: Not indicated  Yellow Fever: Not indicated  Japanese Encephalitis: Not indicated  Meningococcus: Not indicated  Tetanus/Diphtheria: Up to date  Measles/Mumps/Rubella: immune  Cholera: Not needed  Polio: Not indicated  Pneumococcal: deferred  Varicella: Immune by disease history per patient report  Shingrix: advised today and deferred  HPV:  Not indicated   Ch[place]ung[place]ya: Not indicated   Mpox:  Not indicated     TB: low risk     Altitude Exposure on this trip: no  Past tolerance to Altitude: na    ASSESSMENT/PLAN:  Cori was seen today for travel clinic.    Diagnoses and all orders for this visit:    Travel advice encounter  -     COVID-19 12+ (PFIZER)  -     HEPATITIS A 19+ (HAVRIX/VAQTA)  -     ALPRAZolam (XANAX) 0.5 MG tablet; Take 1 tablet (0.5 mg) by mouth as needed for anxiety (for flight anxiety).    Anxiety with  flying  -     ALPRAZolam (XANAX) 0.5 MG tablet; Take 1 tablet (0.5 mg) by mouth as needed for anxiety (for flight anxiety).    H/O motion sickness  -     ondansetron (ZOFRAN ODT) 4 MG ODT tab; Take 1 tablet (4 mg) by mouth every 8 hours as needed for nausea or vomiting.      I have reviewed general recommendations for safe travel   including: food/water precautions, insect precautions, safer sex   practices given high prevalence of Zika, HIV, MPox and other STDs,   roadway safety. Educational materials and [employer] report provided.    Malaraia prophylaxis recommended: none  Symptomatic treatment for traveler's diarrhea: loperamide/        Evacuation insurance advised and resources were provided to patient.    Total visit time 20 minutes  with over 50% of time spent counseling patient and shared decision making as detailed above.    Nereida Palma CNP  Certificate in Travel Health    Additional note:  Patient states she has a long history of Flight anxiety.  She hasn't traveled for several years so she is concerned and is requesting Xanax.  Has taken in the past and has not experienced adverse side effects.      PDMP reviewed : 0       Anxiety with flying  -     ALPRAZolam (XANAX) 0.5 MG tablet; Take 1 tablet (0.5 mg) by mouth as needed for anxiety (for flight anxiety).    Additional 15 minutes for chart review, shared decision making, documentation     Nereida Palma CNP (Lori)  [facility]  Certificate in Travel Health

## 2025-03-26 NOTE — PATIENT INSTRUCTIONS
Thank you for visiting the [facility] : [phone]  Today March 26, 2025 you received the    Hepatitis A Vaccine - Please return on 9/22/25 or later for your 2nd and final dose.    Covid 19      Motion sickness  Meclizine over the counter  Zofran for Nausea      Follow up vaccine appointments can be made as a NURSE ONLY visit at the Travel Clinic, (BE PREPARED TO WAIT, ) or at designated [facility].    If you are receiving the Rabies vaccines series, it is important that you follow the exact schedule ordered.     Pre-travel     We recommend that you purchase Medical Evacuation Insurance prior to your departure.  Https://wwwnc.cdc.gov/travel/page/insurance    [place] your travel plans with the [employer] Department of State through STEP ( Smart Traveler Enrollment Program ) https://step.clypd.gov.  STEP is a free service to allow [place] citizens and nationals traveling and living abroad to enroll their trip with the nearest U.S. Embassy or Consulate.    Animal Exposure: Avoid all mammals even if they look healthy.  If there is a bite, scratch or even a lick, wash area immediately with soap and water for 15 minutes and seek medical care within 24 hours for evaluation of Rabies post exposure treatment.  Contact your Medical Evacuation Insurance.    Repiratory illness prevention strategies ( Covid and Influenza ) CDC recommendations:  Consider wearing a mask in crowded or poorly ventilated indoor areas, including on public transportation and in transportation hubs.  Hand washing: frequent, thorough handwashing with soap and water for 20 seconds. Use an alcohol-based hand  with at least 60% alcohol if soap and water are not readily available. Avoid touching face, nose, eyes, mouth unless you have done appropriate hand washing as above.  VACCINES: Staying up to date on COVID-19 vaccines significantly lowers the risk of getting very sick, being hospitalized, or dying from  COVID-19. CDC recommends that everyone stay up to date on their COVID-19 vaccines, especially people with weakened immune systems.    Travel Covid 19 Testing:  updated 12/06/2021  International travelers: Pre-travel:  See country specific Embassy websites or airline websites.    Post travel: CDC recommends getting tested 3-5 days after your trip     Post-travel illness:  Contact your provider or [facility] if you develop a fever, rash, cough, diarrhea or other symptoms for up to 1 year after travel.  Inform your healthcare provider when and where you traveled to.    Please call the [facility] with any questions [phone]  Or send your provider a 'My Chart' note.

## 2025-05-22 ENCOUNTER — OFFICE VISIT (OUTPATIENT)
Dept: URGENT CARE | Facility: URGENT CARE | Age: 60
End: 2025-05-22
Payer: COMMERCIAL

## 2025-05-22 VITALS
SYSTOLIC BLOOD PRESSURE: 134 MMHG | DIASTOLIC BLOOD PRESSURE: 86 MMHG | HEIGHT: 68 IN | TEMPERATURE: 98.3 F | BODY MASS INDEX: 32.43 KG/M2 | RESPIRATION RATE: 16 BRPM | OXYGEN SATURATION: 96 % | HEART RATE: 64 BPM | WEIGHT: 214 LBS

## 2025-05-22 DIAGNOSIS — M62.838 MUSCLE SPASMS OF NECK: Primary | ICD-10-CM

## 2025-05-22 RX ORDER — CYCLOBENZAPRINE HCL 5 MG
5 TABLET ORAL 3 TIMES DAILY PRN
Qty: 21 TABLET | Refills: 0 | Status: SHIPPED | OUTPATIENT
Start: 2025-05-22 | End: 2025-05-29

## 2025-05-22 ASSESSMENT — PAIN SCALES - GENERAL: PAINLEVEL_OUTOF10: MODERATE PAIN (6)

## 2025-05-22 NOTE — PROGRESS NOTES
Assessment & Plan       ICD-10-CM    1. Muscle spasms of neck  M62.838 cyclobenzaprine (FLEXERIL) 5 MG tablet          Patient Instructions   Perform hot epsom salt soaks, light stretching, lidocaine patches (patient has at home) as needed.     Take muscle relaxer as directed. This can make the patient sleepy, so do not drive while on it or perform important functions.     Ibuprofen 800 mg up to 3 times daily with food which may be alternated with Tylenol 500 to 1000 mg every 8 hours as needed. This would mean Ibuprofen, 4 hours later may take Tylenol, then 4 hours after Tylenol you may take Ibuprofen, and so on. If pain more managed, decrease doses. Maximum dosing of ibuprofen in 24 hours 3200 mg. Maximum dosing of Tylenol in 24 hours is 4000 mg.    If you develop any red flag symptoms  (loss of bowel or bladder, new numbness/weakness, fevers, sustain any trauma to that area, etc) go directly to the Emergency Department.    Patient agreed to the treatment plan and verbalized understanding.       Ronda Persaud is a 59 year old female who presents to clinic today for the following health issues:  Chief Complaint   Patient presents with    Urgent Care     Neck spasm- right sided x a month - worsening - limited ROM      HPI  Patient has had right sided neck muscle spasming over the last month but has worsened in the last week.  She also feels it along the trapezius to her left shoulder.  She states she has been taking ibuprofen and Tylenol with temporary mild relief of her symptoms.  She denies, congestion, coughing, shortness of breath, spinal pain, or other systemic symptoms.  She denies injury.  She states she has had muscle spasms in her back before but not on the neck.  She states she has been eating and drinking without difficulty.  She denies any weakness, numbness, or shooting pains.      Review of Systems   All other systems reviewed and are negative.      Problem List:  2023-06: Rosacea  2023-06:  "Varicose veins of legs  2023-06: Vitamin D deficiency  2023-06: Obesity  2023-06: Depression  2023-06: ADHD (attention deficit hyperactivity disorder)  2010-07: PFO (patent foramen ovale)      History reviewed. No pertinent past medical history.      Social History     Tobacco Use    Smoking status: Never    Smokeless tobacco: Never   Substance Use Topics    Alcohol use: Not on file           Objective    /86   Pulse 64   Temp 98.3  F (36.8  C) (Tympanic)   Resp 16   Ht 1.721 m (5' 7.75\")   Wt 97.1 kg (214 lb)   LMP 03/26/2024 (Within Months)   SpO2 96%   BMI 32.78 kg/m    Physical Exam   General Appearance:  Alert, cooperative, no distress, appears stated age. Afebrile.  Integument: Warm, dry, no rashes or lesions.  HEENT:  Head: Atraumatic, normocephalic. No cranial bone tenderness. Face nontraumatic.  Eyes: Conjunctiva clear, Lids normal. PERRL.   Nose: nares patent. No erythema of nasal mucosa. No rhinorrhea.  Neck: Patient has limited range of motion of the neck but mainly the right lateral motion.  No Cspine tenderness. No edema, erythema, ecchymosis, or rash noted. Tenderness to palpation over the right sternocleidomastoid and trapezius.  Respiratory: No distress. Lungs clear to ausculation bilaterally. No crackles, wheezes, rhonchi or stridor.  Cardiovascular: Regular rate, regular rhythm. No murmurs, rubs, clicks or gallops. No obvious chest wall deformities. No peripheral edema.  Musculoskeletal: Moves all extremities equally. Back: no Tspine or Lspine tenderness.   Neurologic: Alert & oriented to person, place and time. Normal tone. PERRL. Normal speech, no dysarthria.   Motor: RUE/LUE  strength 5/5 bilaterally, elbow flexion/extension 5/5 bilaterally, shoulder elevation 5/5 bilaterally. RLE/LLE knee flexion/extension 5/5 bilaterally, ankle plantar/dorsiflexion 5/5 bilaterally. No pronator drift. Sensory: intact distally  Gait: Normal, no assistive devices.  Patient is able to heel walk " and toe walk.  Psychomotor slowing (-). Abnormal Movements (-).Rapid alternating Movements intact. Finger nose finger intact. Heel to shin normal bilaterally.  Cranial Nerves:  CN II: visual acuity - is able to accurately count fingers with each eye.   CN III, IV, VI: EOM intact, pupils equal and reactive  CN V: facial sensation grosly intact  CN VII: face symmetric, no facial droop  CN VIII: hearing grossly intact bilaterally  CN IX: palate elevation symmetric, uvula at midline  CN XI SCM normal, shoulder shrug nl  CN XII: tongue midline  Psych: Normal mood and affect.        Avani Michelle, NP

## 2025-05-22 NOTE — PROGRESS NOTES
Urgent Care Clinic Visit     Chief Complaint   Patient presents with    Urgent Care     Neck spasm- right sided x a month - worsening - limited ROM                5/22/2025    12:46 PM   Additional Questions   Roomed by Emani

## 2025-05-22 NOTE — PATIENT INSTRUCTIONS
Perform hot epsom salt soaks, light stretching, lidocaine patches (patient has at home) as needed.     Take muscle relaxer as directed. This can make the patient sleepy, so do not drive while on it or perform important functions.     Ibuprofen 800 mg up to 3 times daily with food which may be alternated with Tylenol 500 to 1000 mg every 8 hours as needed. This would mean Ibuprofen, 4 hours later may take Tylenol, then 4 hours after Tylenol you may take Ibuprofen, and so on. If pain more managed, decrease doses. Maximum dosing of ibuprofen in 24 hours 3200 mg. Maximum dosing of Tylenol in 24 hours is 4000 mg.    If you develop any red flag symptoms  (loss of bowel or bladder, new numbness/weakness, fevers, sustain any trauma to that area, etc) go directly to the Emergency Department.

## 2025-05-22 NOTE — LETTER
May 22, 2025      Cori Toure  [address] [address]        To Whom It May Concern:    Cori Buttverson was seen on 0/22/2025.  Please excuse her from work on 05/22/2025 and 05/23/2025 due to illness.        Sincerely,        Avani Michelle NP    Electronically signed

## 2025-06-08 ENCOUNTER — HEALTH MAINTENANCE LETTER (OUTPATIENT)
Age: 60
End: 2025-06-08